# Patient Record
Sex: FEMALE
[De-identification: names, ages, dates, MRNs, and addresses within clinical notes are randomized per-mention and may not be internally consistent; named-entity substitution may affect disease eponyms.]

---

## 2018-02-28 ENCOUNTER — RESULT REVIEW (OUTPATIENT)
Age: 53
End: 2018-02-28

## 2019-09-16 ENCOUNTER — RESULT REVIEW (OUTPATIENT)
Age: 54
End: 2019-09-16

## 2020-11-04 ENCOUNTER — RESULT REVIEW (OUTPATIENT)
Age: 55
End: 2020-11-04

## 2020-11-16 ENCOUNTER — APPOINTMENT (OUTPATIENT)
Dept: INTERNAL MEDICINE | Facility: CLINIC | Age: 55
End: 2020-11-16
Payer: COMMERCIAL

## 2020-11-16 DIAGNOSIS — M54.9 DORSALGIA, UNSPECIFIED: ICD-10-CM

## 2020-11-16 PROBLEM — Z00.00 ENCOUNTER FOR PREVENTIVE HEALTH EXAMINATION: Status: ACTIVE | Noted: 2020-11-16

## 2020-11-16 PROCEDURE — 99204 OFFICE O/P NEW MOD 45 MIN: CPT | Mod: 25,95

## 2020-11-16 RX ORDER — CYCLOBENZAPRINE HYDROCHLORIDE 5 MG/1
5 TABLET, FILM COATED ORAL
Qty: 14 | Refills: 1 | Status: ACTIVE | COMMUNITY
Start: 2020-11-16 | End: 1900-01-01

## 2020-11-16 RX ORDER — NAPROXEN 500 MG/1
500 TABLET ORAL
Qty: 30 | Refills: 2 | Status: ACTIVE | COMMUNITY
Start: 2020-11-16 | End: 1900-01-01

## 2020-11-16 NOTE — PHYSICAL EXAM
[No Acute Distress] : no acute distress [No Respiratory Distress] : no respiratory distress  [Normal Affect] : the affect was normal [Normal Insight/Judgement] : insight and judgment were intact

## 2020-11-16 NOTE — HISTORY OF PRESENT ILLNESS
[Home] : at home, [unfilled] , at the time of the visit. [Medical Office: ([facility])___] : at the medical office located in  [Verbal consent obtained from patient] : the patient, [unfilled] [FreeTextEntry8] : Ms. ROBERT LAWRENCE is a 55 year old woman with pmhx of thyroid cancer with thyroidectomy > 10 years ago on levothyroxine, sciatica who calls for acute vist on nov 16th. She endorses she started having MSK symptoms on monday nov 9th at night, she went to get tested on tuesday and was found positive with covid 19. She has been isolating at home with family. She believes she got it from her son, whom's teacher was found covid 19 positive. She endorses loss of sense of smells, mild fever, bodyaches, frontal headaches and lower back pain. Cough can exacerbate the back pain. She has a hx of sciatica. Raising her legs improved the pain. She has tried acetaminophen prn and motrin prn for back pain with little improvement. She states acetaminophen helps with headaches and neck pain. Fever has been subsiding. She endorses occasional burning on urination for the past 2 days, no dark color urine, or any blood or foul smelling urine. She has 1 episode of diarrhea a day. She endorses decreased appetitie.

## 2020-11-16 NOTE — ASSESSMENT
[FreeTextEntry1] : Family performed a CVA taps which were negative. \par \par Patient advised to call back if any symptoms, or visit ED for any alarming symptoms.

## 2020-11-16 NOTE — REVIEW OF SYSTEMS
[Fever] : fever [Chills] : chills [Fatigue] : fatigue [Nasal Discharge] : nasal discharge [Sore Throat] : sore throat [Cough] : cough [Nausea] : nausea [Diarrhea] : diarrhea [Dysuria] : dysuria [Back Pain] : back pain [Negative] : Genitourinary [Vision Problems] : no vision problems [Chest Pain] : no chest pain [Palpitations] : no palpitations [Lower Ext Edema] : no lower extremity edema [Shortness Of Breath] : no shortness of breath [Wheezing] : no wheezing [Dyspnea on Exertion] : no dyspnea on exertion [Abdominal Pain] : no abdominal pain [Constipation] : no constipation [Vomiting] : no vomiting [Heartburn] : no heartburn [Hematuria] : no hematuria

## 2021-05-10 ENCOUNTER — APPOINTMENT (OUTPATIENT)
Dept: UROLOGY | Facility: CLINIC | Age: 56
End: 2021-05-10
Payer: COMMERCIAL

## 2021-05-10 ENCOUNTER — TRANSCRIPTION ENCOUNTER (OUTPATIENT)
Age: 56
End: 2021-05-10

## 2021-05-10 VITALS
TEMPERATURE: 98 F | DIASTOLIC BLOOD PRESSURE: 76 MMHG | BODY MASS INDEX: 24.8 KG/M2 | SYSTOLIC BLOOD PRESSURE: 126 MMHG | WEIGHT: 140 LBS | HEART RATE: 73 BPM | HEIGHT: 63 IN

## 2021-05-10 DIAGNOSIS — Z78.9 OTHER SPECIFIED HEALTH STATUS: ICD-10-CM

## 2021-05-10 DIAGNOSIS — Z80.1 FAMILY HISTORY OF MALIGNANT NEOPLASM OF TRACHEA, BRONCHUS AND LUNG: ICD-10-CM

## 2021-05-10 PROCEDURE — 99204 OFFICE O/P NEW MOD 45 MIN: CPT

## 2021-05-10 PROCEDURE — 99072 ADDL SUPL MATRL&STAF TM PHE: CPT

## 2021-05-10 RX ORDER — SULFAMETHOXAZOLE AND TRIMETHOPRIM 800; 160 MG/1; MG/1
800-160 TABLET ORAL TWICE DAILY
Qty: 14 | Refills: 0 | Status: ACTIVE | COMMUNITY
Start: 2021-05-10 | End: 1900-01-01

## 2021-05-10 NOTE — PHYSICAL EXAM
[General Appearance - Well Developed] : well developed [General Appearance - Well Nourished] : well nourished [Normal Appearance] : normal appearance [Well Groomed] : well groomed [General Appearance - In No Acute Distress] : no acute distress [Edema] : no peripheral edema [Respiration, Rhythm And Depth] : normal respiratory rhythm and effort [Exaggerated Use Of Accessory Muscles For Inspiration] : no accessory muscle use [Abdomen Soft] : soft [Abdomen Tenderness] : non-tender [Costovertebral Angle Tenderness] : no ~M costovertebral angle tenderness [Urinary Bladder Findings] : the bladder was normal on palpation [Normal Station and Gait] : the gait and station were normal for the patient's age [] : no rash [No Focal Deficits] : no focal deficits [Oriented To Time, Place, And Person] : oriented to person, place, and time [Mood] : the mood was normal [Affect] : the affect was normal [Not Anxious] : not anxious [No Palpable Adenopathy] : no palpable adenopathy

## 2021-05-13 LAB
APPEARANCE: CLEAR
BACTERIA UR CULT: NORMAL
BACTERIA: ABNORMAL
BILIRUBIN URINE: NEGATIVE
BLOOD URINE: NEGATIVE
COLOR: YELLOW
GLUCOSE QUALITATIVE U: NEGATIVE
HYALINE CASTS: 0 /LPF
KETONES URINE: NEGATIVE
LEUKOCYTE ESTERASE URINE: ABNORMAL
MICROSCOPIC-UA: NORMAL
NITRITE URINE: NEGATIVE
PH URINE: 6
PROTEIN URINE: NORMAL
RED BLOOD CELLS URINE: 1 /HPF
SPECIFIC GRAVITY URINE: 1.03
SQUAMOUS EPITHELIAL CELLS: 3 /HPF
UROBILINOGEN URINE: NORMAL
WHITE BLOOD CELLS URINE: 58 /HPF

## 2021-05-16 RX ORDER — LEVOTHYROXINE SODIUM 0.17 MG/1
TABLET ORAL
Refills: 0 | Status: ACTIVE | COMMUNITY

## 2021-05-16 NOTE — ASSESSMENT
[FreeTextEntry1] : 55 yo F with recurrent UTI\par \par - Reviewed previous culture from last week and confirmed findings as stated above\par - Given persistence of symptoms, repeat UA and culture today. Start Bactrim Rx.\par - discussed possible etiologies for UTI. Spent extensive period of time discussed behavioral modification including adequate hydration, cutting back on caffeine intake, timed voiding during the day, importance of controlling any diabetes and chronic constipation and how all of these things could potentially increase risk for persistent or recurrent UTI.\par - Renal US

## 2021-05-16 NOTE — REVIEW OF SYSTEMS
[Negative] : Heme/Lymph [see HPI] : see HPI [FreeTextEntry6] : reoccurring UTI , Dr. Keagan Morales is her pcp but she found this place on her own

## 2021-05-16 NOTE — HISTORY OF PRESENT ILLNESS
[FreeTextEntry1] : 55 yo F presents with history of dysuria, urgency\par no gross hematuria\par Started on abx - last dose of abx 2-3 days ago, some mild improvement in symptoms\par stress incontinence - pads changed every 2/day\par History of recurrent UTI and was on prophylactic abx with sex at one point\par Was doing much better and prior to this, last UTI was 6 months ago\par still sexually active - no dyspareunia\par Voiding every 3-4 hours\par Currently no bothersome LUTS\par Drinks 2-3 bottles of water, 2 cups of coffee daily\par normal bowel movements\par 5 children, \par LMP = 23\par 23 pan sensitive e.coli\par no history of kidney stones but father did

## 2021-05-24 ENCOUNTER — APPOINTMENT (OUTPATIENT)
Age: 56
End: 2021-05-24
Payer: COMMERCIAL

## 2021-05-24 DIAGNOSIS — N39.0 URINARY TRACT INFECTION, SITE NOT SPECIFIED: ICD-10-CM

## 2021-05-24 PROCEDURE — 76705 ECHO EXAM OF ABDOMEN: CPT

## 2021-05-25 PROBLEM — N39.0 RECURRENT UTI: Status: ACTIVE | Noted: 2021-05-10

## 2021-07-15 ENCOUNTER — TRANSCRIPTION ENCOUNTER (OUTPATIENT)
Age: 56
End: 2021-07-15

## 2021-07-19 LAB
APPEARANCE: CLEAR
BACTERIA UR CULT: ABNORMAL
BACTERIA: ABNORMAL
BILIRUBIN URINE: NEGATIVE
BLOOD URINE: NEGATIVE
COLOR: YELLOW
GLUCOSE QUALITATIVE U: NEGATIVE
HYALINE CASTS: 0 /LPF
KETONES URINE: NEGATIVE
LEUKOCYTE ESTERASE URINE: ABNORMAL
MICROSCOPIC-UA: NORMAL
NITRITE URINE: POSITIVE
PH URINE: 6
PROTEIN URINE: NEGATIVE
RED BLOOD CELLS URINE: 1 /HPF
SPECIFIC GRAVITY URINE: 1.01
SQUAMOUS EPITHELIAL CELLS: 0 /HPF
UROBILINOGEN URINE: NORMAL
WHITE BLOOD CELLS URINE: 21 /HPF

## 2021-09-20 RX ORDER — CIPROFLOXACIN HYDROCHLORIDE 500 MG/1
500 TABLET, FILM COATED ORAL TWICE DAILY
Qty: 10 | Refills: 0 | Status: ACTIVE | COMMUNITY
Start: 2021-07-15 | End: 1900-01-01

## 2021-09-21 ENCOUNTER — TRANSCRIPTION ENCOUNTER (OUTPATIENT)
Age: 56
End: 2021-09-21

## 2021-09-23 LAB
APPEARANCE: CLEAR
BACTERIA UR CULT: ABNORMAL
BACTERIA: NEGATIVE
BILIRUBIN URINE: NEGATIVE
BLOOD URINE: NEGATIVE
COLOR: YELLOW
GLUCOSE QUALITATIVE U: NEGATIVE
HYALINE CASTS: 3 /LPF
KETONES URINE: NEGATIVE
LEUKOCYTE ESTERASE URINE: NEGATIVE
MICROSCOPIC-UA: NORMAL
NITRITE URINE: NEGATIVE
PH URINE: 7
PROTEIN URINE: NEGATIVE
RED BLOOD CELLS URINE: 1 /HPF
SPECIFIC GRAVITY URINE: 1.02
SQUAMOUS EPITHELIAL CELLS: 2 /HPF
UROBILINOGEN URINE: NORMAL
WHITE BLOOD CELLS URINE: 3 /HPF

## 2021-09-27 ENCOUNTER — NON-APPOINTMENT (OUTPATIENT)
Age: 56
End: 2021-09-27

## 2022-01-11 ENCOUNTER — TRANSCRIPTION ENCOUNTER (OUTPATIENT)
Age: 57
End: 2022-01-11

## 2022-01-12 ENCOUNTER — APPOINTMENT (OUTPATIENT)
Dept: OTOLARYNGOLOGY | Facility: CLINIC | Age: 57
End: 2022-01-12
Payer: MEDICAID

## 2022-01-12 VITALS
HEIGHT: 63 IN | DIASTOLIC BLOOD PRESSURE: 65 MMHG | TEMPERATURE: 98 F | HEART RATE: 71 BPM | BODY MASS INDEX: 24.1 KG/M2 | SYSTOLIC BLOOD PRESSURE: 115 MMHG | WEIGHT: 136 LBS

## 2022-01-12 DIAGNOSIS — R42 DIZZINESS AND GIDDINESS: ICD-10-CM

## 2022-01-12 DIAGNOSIS — Z85.850 PERSONAL HISTORY OF MALIGNANT NEOPLASM OF THYROID: ICD-10-CM

## 2022-01-12 DIAGNOSIS — H90.5 UNSPECIFIED SENSORINEURAL HEARING LOSS: ICD-10-CM

## 2022-01-12 DIAGNOSIS — U07.1 COVID-19: ICD-10-CM

## 2022-01-12 DIAGNOSIS — H81.01 MENIERE'S DISEASE, RIGHT EAR: ICD-10-CM

## 2022-01-12 DIAGNOSIS — Z01.10 ENCOUNTER FOR EXAMINATION OF EARS AND HEARING W/OUT ABNORMAL FINDINGS: ICD-10-CM

## 2022-01-12 PROCEDURE — 92557 COMPREHENSIVE HEARING TEST: CPT

## 2022-01-12 PROCEDURE — 92567 TYMPANOMETRY: CPT

## 2022-01-12 PROCEDURE — 99204 OFFICE O/P NEW MOD 45 MIN: CPT | Mod: 25

## 2022-01-12 RX ORDER — METHYLPREDNISOLONE 4 MG/1
4 TABLET ORAL
Qty: 1 | Refills: 1 | Status: ACTIVE | COMMUNITY
Start: 2022-01-12 | End: 1900-01-01

## 2022-01-12 NOTE — PHYSICAL EXAM
[Fukuda Step Test] : Fukuda Step Test was Positive [Midline] : trachea located in midline position [Normal] : no rashes [Hearing Loss Right Only] : normal [Hearing Loss Left Only] : normal [Nystagmus] : ~T no ~M nystagmus was seen [[place]] : [place]: Negative [de-identified] : abn [de-identified] : Sway to the right

## 2022-01-12 NOTE — HISTORY OF PRESENT ILLNESS
[Thyroid] : thyroid surgery [No] : patient does not have a  history of radiation therapy [de-identified] : 55 yo female\par Patient with hx of total thyroidectomy complains of vertigo s/p COVID booster in December. States 5 days after the booster she got severe vertigo with N/V and heart palpations. She went to , was treated for a UTI and sent home. She is getting better, but she feels like she is in a head fog. Has lots of pressure in both ears that is worse at night. Her diet does consist of salty foods. Pt has no ear pain, ear drainage, hearing loss, tinnitus, nasal congestion, nasal discharge, epistaxis, sinus infections, facial pain, facial pressure, throat pain, dysphagia or fevers\par \par  [Ear Fullness] : ear fullness [Vertigo] : vertigo [Otalgia] : otalgia [Eustachian Tube Dysfunction] : no eustachian tube dysfunction [Cholesteatoma] : no cholesteatoma [Allergic Rhinitis] : no allergic rhinitis [Adenoidectomy] : no adenoidectomy [Allergies] : no allergies [Asthma] : no asthma [Hyperthyroidism] : no hyperthyroidism [Sialadenitis] : no sialadenitis [Hodgkin Disease] : no hodgkin disease [Non-Hodgkin Lymphoma] : no non-hodgkin lymphoma [None] : No risk factors have been identified. [Graves Disease] : no graves disease [Thyroid Cancer] : no thyroid cancer

## 2022-01-12 NOTE — ASSESSMENT
[FreeTextEntry1] : Patient with hx of total thyroidectomy complains of vertigo s/p COVID booster in December. States 5 days after the booster she got severe vertigo with N/V and heart palpations. She went to , was treated for a UTI and sent home. She is getting better, but she feels like she is in a head fog. Has lots of pressure in both ears that is worse at night. On exam she has a positive Fukuda step test, sway to the right \par \par Vertigo:\par -Hearing Test performed to evaluate the extent of hearing loss and to explain pt's symptoms\par \par poss Right ELH 2/2 Covid Vaccine\par Rec\par low salt diet\par rx-Medrol dospak\par acupuncture\par poss ECoG\par \par f/u 2 weeks\par \par \par f/u prn

## 2022-01-12 NOTE — DATA REVIEWED
[de-identified] : Hearing Test performed to evaluate the extent of hearing loss and  to explain pt's symptoms\par today's hearing test was personally reviewed and revealed\par RT: WNL/mild SNHL\par LT: WNL

## 2022-01-12 NOTE — END OF VISIT
[FreeTextEntry3] : I personally saw and examined ROBERT LAWRENCE in detail. I spoke to SOM Franco regarding the assessment and plan of care. I reviewed the above assessment and plan of care, and agree. I have made changes in changes in the body of the note where appropriate.I personally reviewed the HPI, PMH, FH, SH, ROS and medications/allergies. I have spoken to SOM Franco regarding the history and have personally determined the assessment and plan of care, and documented this myself. I was present and participated in all key portions of the encounter and all procedures noted above. I have made changes in the body of the note where appropriate.\par \par Attesting Faculty: See Attending Signature Below \par \par \par  [Time Spent: ___ minutes] : I have spent [unfilled] minutes of time on the encounter.

## 2022-07-21 ENCOUNTER — TRANSCRIPTION ENCOUNTER (OUTPATIENT)
Age: 57
End: 2022-07-21

## 2025-02-07 RX ORDER — ESTRADIOL 10 UG/1
10 TABLET, FILM COATED VAGINAL AT BEDTIME
Qty: 54 | Refills: 0 | Status: ACTIVE | COMMUNITY
Start: 2025-02-07 | End: 1900-01-01

## 2025-04-23 ENCOUNTER — APPOINTMENT (OUTPATIENT)
Facility: CLINIC | Age: 60
End: 2025-04-23

## 2025-04-23 ENCOUNTER — NON-APPOINTMENT (OUTPATIENT)
Age: 60
End: 2025-04-23

## 2025-04-23 VITALS — BODY MASS INDEX: 26.93 KG/M2 | SYSTOLIC BLOOD PRESSURE: 122 MMHG | DIASTOLIC BLOOD PRESSURE: 79 MMHG | WEIGHT: 152 LBS

## 2025-04-23 DIAGNOSIS — N95.2 POSTMENOPAUSAL ATROPHIC VAGINITIS: ICD-10-CM

## 2025-04-23 DIAGNOSIS — Z92.89 PERSONAL HISTORY OF OTHER MEDICAL TREATMENT: ICD-10-CM

## 2025-04-23 DIAGNOSIS — Z01.411 ENCOUNTER FOR GYNECOLOGICAL EXAMINATION (GENERAL) (ROUTINE) WITH ABNORMAL FINDINGS: ICD-10-CM

## 2025-04-23 DIAGNOSIS — N94.10 UNSPECIFIED DYSPAREUNIA: ICD-10-CM

## 2025-04-23 DIAGNOSIS — N39.0 URINARY TRACT INFECTION, SITE NOT SPECIFIED: ICD-10-CM

## 2025-04-23 DIAGNOSIS — N95.8 OTHER SPECIFIED MENOPAUSAL AND PERIMENOPAUSAL DISORDERS: ICD-10-CM

## 2025-04-23 DIAGNOSIS — Z98.890 OTHER SPECIFIED POSTPROCEDURAL STATES: ICD-10-CM

## 2025-04-23 PROCEDURE — 99396 PREV VISIT EST AGE 40-64: CPT

## 2025-04-23 PROCEDURE — 99459 PELVIC EXAMINATION: CPT

## 2025-04-23 PROCEDURE — 82270 OCCULT BLOOD FECES: CPT

## 2025-04-23 PROCEDURE — G0444 DEPRESSION SCREEN ANNUAL: CPT | Mod: 59

## 2025-04-23 PROCEDURE — 99214 OFFICE O/P EST MOD 30 MIN: CPT | Mod: 25

## 2025-04-24 LAB — HPV HIGH+LOW RISK DNA PNL CVX: NOT DETECTED

## 2025-04-27 PROBLEM — Z01.411 ENCOUNTER FOR GYNECOLOGICAL EXAMINATION (GENERAL) (ROUTINE) WITH ABNORMAL FINDINGS: Status: ACTIVE | Noted: 2025-04-27

## 2025-04-27 PROBLEM — N95.2 VAGINAL ATROPHY: Status: ACTIVE | Noted: 2025-04-27

## 2025-04-27 PROBLEM — N95.8 GENITOURINARY SYNDROME OF MENOPAUSE: Status: ACTIVE | Noted: 2025-04-27

## 2025-04-27 PROBLEM — N94.10 DYSPAREUNIA, FEMALE: Status: ACTIVE | Noted: 2025-04-27

## 2025-04-28 LAB — CYTOLOGY CVX/VAG DOC THIN PREP: NORMAL

## 2025-06-11 ENCOUNTER — APPOINTMENT (OUTPATIENT)
Facility: CLINIC | Age: 60
End: 2025-06-11